# Patient Record
Sex: FEMALE | Race: WHITE
[De-identification: names, ages, dates, MRNs, and addresses within clinical notes are randomized per-mention and may not be internally consistent; named-entity substitution may affect disease eponyms.]

---

## 2016-06-14 VITALS — DIASTOLIC BLOOD PRESSURE: 67 MMHG | SYSTOLIC BLOOD PRESSURE: 145 MMHG

## 2017-02-03 ENCOUNTER — HOSPITAL ENCOUNTER (OUTPATIENT)
Dept: HOSPITAL 44 - LAB | Age: 72
End: 2017-02-03
Attending: PHYSICIAN ASSISTANT
Payer: MEDICARE

## 2017-02-03 DIAGNOSIS — R10.30: Primary | ICD-10-CM

## 2017-02-03 LAB
AMORPHOUS SEDIMENT,UR: (no result)
PH UR STRIP: 5 [PH] (ref 5–8)
UROBILINOGEN URINE: 0.2 EU (ref 0.2–1)

## 2017-02-03 PROCEDURE — 81002 URINALYSIS NONAUTO W/O SCOPE: CPT

## 2017-03-30 ENCOUNTER — HOSPITAL ENCOUNTER (EMERGENCY)
Dept: HOSPITAL 44 - ED | Age: 72
Discharge: HOME | End: 2017-03-30
Payer: MEDICARE

## 2017-03-30 DIAGNOSIS — H81.09: Primary | ICD-10-CM

## 2017-03-30 PROCEDURE — 99283 EMERGENCY DEPT VISIT LOW MDM: CPT

## 2017-03-30 PROCEDURE — 96372 THER/PROPH/DIAG INJ SC/IM: CPT

## 2017-03-30 PROCEDURE — 99284 EMERGENCY DEPT VISIT MOD MDM: CPT

## 2017-03-30 PROCEDURE — S1016 NON-PVC INTRAVENOUS ADMINIST: HCPCS

## 2017-03-30 PROCEDURE — 96374 THER/PROPH/DIAG INJ IV PUSH: CPT

## 2017-03-30 NOTE — ED PHYSICIAN DOCUMENTATION
General Adult





- HISTORIAN


Historian: patient





- HPI


Stated Complaint: weakness, vomiting


Chief Complaint: General Adult


Onset: hours


Timing: still present


Severity: moderate


Further Comments: yes (Pt is a 71 yo female with hx Meniere's disease, who has 

had weakness, n/v today.  It began a few hours pta when the pt was out walking 

with her sister.  Pt attributes sx to a Menier's episode.  Pt has had diarrhea, 

and says that she has had this with previous Meniere's episodes.)





- PAST HX


Past History: hypertension, other (Meniere's dz; HTN; HLD;)


Surgeries/Procedures: other (partial colectomy in her 20's due to polyp.)





- SOCIAL HX


Smoking History: non-smoker





- FAMILY HX


Family History: No





- VITAL SIGNS


Vital Signs: 





 Vital Signs











Temp Pulse Resp BP Pulse Ox


 


          145/67    


 


          06/14/16 17:45   














- REVIEWED ASSESSMENTS


Nursing Assessment  Reviewed: Yes


Vitals Reviewed: Yes





<Ashvin Nina - Last Filed: 03/30/17 19:15>





- HISTORIAN


Historian: patient, child





- HPI


Onset: hours


Timing: still present


Severity: moderate





- ROS


CONST: no problems





- PAST HX


Past History: hypertension, other


Surgeries/Procedures: other





- SOCIAL HX


Smoking History: non-smoker





- FAMILY HX


Family History: No





- VITAL SIGNS


Vital Signs: 





 Vital Signs











Temp Pulse Resp BP Pulse Ox


 


 97.6 F   89   20   194/97   96 


 


 03/30/17 18:19  03/30/17 18:19  03/30/17 18:19  03/30/17 18:19  03/30/17 18:19














- REVIEWED ASSESSMENTS


Nursing Assessment  Reviewed: Yes


Vitals Reviewed: Yes





<CARLEY GUIDRY - Last Filed: 03/30/17 20:33>





- PAST HX


Allergies/Adverse Reactions: 


 Allergies











Allergy/AdvReac Type Severity Reaction Status Date / Time


 


No Known Allergies Allergy   Verified 03/30/17 19:26














Home Medications: 


 Ambulatory Orders











 Medication  Instructions  Recorded


 


Omeprazole [Prilosec] 40 mg PO D 03/30/17


 


Ondansetron HCl Tablet [Zofran] 5 mg PO PRN PRN 03/30/17














Progress





- Progress


Progress: 


Zofran 4 mg IV





Meclizine 25 mg po





Care transferred to Car[facility] Guidry at 1900.





<Ashvin Nina - Last Filed: 03/30/17 19:15>





- Progress


Progress: 





2025, given phenergan as she still felt nausea and dizziness after meclizine 

and zofran. No color is better and she is momentarily able to focus her gaze. 

Says she has meclizine and zofran at home. Will give her script for phenergan 

tabs. Asked for valium shot earlier. 





<CARLEY GUIDRY - Last Filed: 03/30/17 20:33>





ED Results Lab/Radiology





- Orders


Orders: 





 ED Orders











 Category Date Time Status


 


 Place Saline Lock/IV Now Care  03/30/17 18:53 Active


 


 Meclizine HCl [Antivert] Med  03/30/17 18:56 Discontinued





 25 mg PO NOW ONE   


 


 Ondansetron HCl/Pf [Zofran 4 mg/2 ml] Med  03/30/17 18:47 Discontinued





 4 mg .ROUTE .STK-MED ONE   


 


 Ondansetron HCl/Pf [Zofran 4 mg/2 ml] Med  03/30/17 18:46 Discontinued





 4 mg IVP NOW ONE   


 


 Promethazine HCl [Phenergan] Med  03/30/17 19:42 Discontinued





 25 mg IM NOW ONE   














<CARLEY GUIDRY - Last Filed: 03/30/17 20:33>





General Adult Physical Exam





- PHYSICAL EXAM


GENERAL APPEARANCE: moderate distress


EENT: pharynx normal


NECK: normal inspection, supple


RESPIRATORY: no resp distress, chest non-tender, breath sounds normal


CVS: reg rate & rhythm, heart sounds normal


ABDOMEN: tenderness (diffuse)


BACK: normal inspection


SKIN: warm/dry, normal color


EXTREMITIES: non-tender, normal range of motion


NEURO: oriented X3, motor nml, sensation nml





<Ashvin Nina - Last Filed: 03/30/17 19:15>





- PHYSICAL EXAM


GENERAL APPEARANCE: moderate distress


NECK: normal inspection, supple


RESPIRATORY: no resp distress


BACK: other (movements w/o pain)


SKIN: warm/dry, normal color


EXTREMITIES: non-tender


NEURO: motor nml, sensation nml





<CARLEY GUIDRY - Last Filed: 03/30/17 20:33>





Discharge





<Ashvin Nina - Last Filed: 03/30/17 19:15>


Decision to Admit: NO


Decision Time: 20:25





<CARLEY GUIDRY - Last Filed: 03/30/17 20:33>


Clincal Impression: 


 Menieres disease





Additional Instructions: 


Home to sleep. Follow up with Dr. Gamble in the next 3-5 days. 


Home Medications: 


Ambulatory Orders





Omeprazole [Prilosec] 40 mg PO D 03/30/17 


Ondansetron HCl Tablet [Zofran] 5 mg PO PRN PRN 03/30/17 








Condition: Fair


Disposition: 01 HOME, SELF-CARE

## 2017-03-31 VITALS
SYSTOLIC BLOOD PRESSURE: 153 MMHG | DIASTOLIC BLOOD PRESSURE: 72 MMHG | SYSTOLIC BLOOD PRESSURE: 153 MMHG | DIASTOLIC BLOOD PRESSURE: 72 MMHG | SYSTOLIC BLOOD PRESSURE: 153 MMHG | DIASTOLIC BLOOD PRESSURE: 72 MMHG | DIASTOLIC BLOOD PRESSURE: 72 MMHG | SYSTOLIC BLOOD PRESSURE: 153 MMHG

## 2018-05-09 ENCOUNTER — HOSPITAL ENCOUNTER (OUTPATIENT)
Dept: HOSPITAL 44 - RT | Age: 73
End: 2018-05-09
Attending: FAMILY MEDICINE
Payer: MEDICARE

## 2018-05-09 DIAGNOSIS — I10: ICD-10-CM

## 2018-05-09 DIAGNOSIS — R06.09: Primary | ICD-10-CM

## 2018-05-09 LAB
BASOPHILS NFR BLD: 0.4 % (ref 0–1.5)
EGFR (AFRICAN): > 60
EGFR (NON-AFRICAN): > 60
EOSINOPHIL NFR BLD: 1.5 % (ref 0–6.8)
MCH RBC QN AUTO: 29.1 PG (ref 28–34)
MCV RBC AUTO: 89.8 FL (ref 80–100)
MONOCYTES %: 5 % (ref 0–11)
NEUTROPHILS #: 4.3 # K/UL (ref 1.4–7.7)

## 2018-05-09 PROCEDURE — 71046 X-RAY EXAM CHEST 2 VIEWS: CPT

## 2018-05-09 PROCEDURE — 85025 COMPLETE CBC W/AUTO DIFF WBC: CPT

## 2018-05-09 PROCEDURE — 80053 COMPREHEN METABOLIC PANEL: CPT

## 2018-05-09 PROCEDURE — 36415 COLL VENOUS BLD VENIPUNCTURE: CPT

## 2018-05-09 NOTE — DIAGNOSTIC IMAGING REPORT
JLUIS WOODS 

[facility]

[address] [facility] [address] [address]

 

 

 

 

Report Submission Date: May 9, 2018 12:28:53 PM CDT

Patient       Study

Name:   AVILA CROSS       Date:   May 9, 2018 12:13:59 PM CDT

MRN:   [id number]       Modality Type:   DX

Gender:   F       Description:   CHEST

:   49       Institution:   [facility]

Physician:   JLUIS WOODS

     Accession:    [id number]

 

 

Examination: PA and lateral chest. 



History: CXR, DYSPNEA ON EXERTION, WORSENING X2 WEEKS (Hx) 



Comparison exam: None available 



Findings: PA lateral chest demonstrate a normal cardiac and mediastinal 
silhouette.  Tortuous aorta. No focal infiltrate.  No blunting of the 
costophrenic margins.  Osseous structures are appropriate for age. 



Impression: No acute pulmonary process.

 

Electronically signed on May 9, 2018 12:28:53 PM CDT by:

Vincent ARAIZA

## 2018-05-15 ENCOUNTER — HOSPITAL ENCOUNTER (OUTPATIENT)
Dept: HOSPITAL 44 - CARD | Age: 73
End: 2018-05-15
Attending: INTERNAL MEDICINE
Payer: MEDICARE

## 2018-05-15 DIAGNOSIS — E78.00: ICD-10-CM

## 2018-05-15 DIAGNOSIS — R06.00: Primary | ICD-10-CM

## 2018-05-15 DIAGNOSIS — I10: ICD-10-CM

## 2018-07-24 ENCOUNTER — HOSPITAL ENCOUNTER (OUTPATIENT)
Dept: HOSPITAL 44 - LAB | Age: 73
End: 2018-07-24
Attending: PHYSICIAN ASSISTANT
Payer: MEDICARE

## 2018-07-24 DIAGNOSIS — R42: ICD-10-CM

## 2018-07-24 DIAGNOSIS — R00.2: Primary | ICD-10-CM

## 2018-07-24 LAB
BASOPHILS NFR BLD: 0.5 % (ref 0–1.5)
EGFR (AFRICAN): > 60
EGFR (NON-AFRICAN): > 60
EOSINOPHIL NFR BLD: 0.9 % (ref 0–6.8)
MCH RBC QN AUTO: 29.6 PG (ref 28–34)
MCV RBC AUTO: 90.7 FL (ref 80–100)
MONOCYTES %: 5 % (ref 0–11)
NEUTROPHILS #: 5.2 # K/UL (ref 1.4–7.7)

## 2018-07-24 PROCEDURE — 84443 ASSAY THYROID STIM HORMONE: CPT

## 2018-07-24 PROCEDURE — 85025 COMPLETE CBC W/AUTO DIFF WBC: CPT

## 2018-07-24 PROCEDURE — 36415 COLL VENOUS BLD VENIPUNCTURE: CPT

## 2018-07-24 PROCEDURE — 80053 COMPREHEN METABOLIC PANEL: CPT
